# Patient Record
Sex: FEMALE | Race: BLACK OR AFRICAN AMERICAN | NOT HISPANIC OR LATINO | Employment: OTHER | URBAN - METROPOLITAN AREA
[De-identification: names, ages, dates, MRNs, and addresses within clinical notes are randomized per-mention and may not be internally consistent; named-entity substitution may affect disease eponyms.]

---

## 2017-03-21 ENCOUNTER — HOSPITAL ENCOUNTER (OUTPATIENT)
Dept: RADIOLOGY | Facility: HOSPITAL | Age: 82
Discharge: HOME | End: 2017-03-21
Attending: PHYSICAL MEDICINE & REHABILITATION
Payer: MEDICARE

## 2017-03-21 DIAGNOSIS — R92.8 ABNORMAL MAMMOGRAM OF LEFT BREAST: ICD-10-CM

## 2017-03-21 PROCEDURE — 76642 ULTRASOUND BREAST LIMITED: CPT | Mod: TC,LT

## 2017-03-21 PROCEDURE — 76642 ULTRASOUND BREAST LIMITED: CPT | Mod: 26,LT,, | Performed by: RADIOLOGY

## 2017-03-21 PROCEDURE — 77062 BREAST TOMOSYNTHESIS BI: CPT | Mod: 26,,, | Performed by: RADIOLOGY

## 2017-03-21 PROCEDURE — 77066 DX MAMMO INCL CAD BI: CPT | Mod: 26,,, | Performed by: RADIOLOGY

## 2017-03-21 PROCEDURE — 77066 DX MAMMO INCL CAD BI: CPT | Mod: TC

## 2017-07-28 PROBLEM — E78.00 HYPERCHOLESTEREMIA: Chronic | Status: ACTIVE | Noted: 2017-07-28

## 2017-08-23 ENCOUNTER — TELEPHONE (OUTPATIENT)
Dept: PRIMARY CARE CLINIC | Facility: CLINIC | Age: 82
End: 2017-08-23

## 2017-08-23 NOTE — TELEPHONE ENCOUNTER
----- Message from Enzo Jones sent at 8/23/2017  4:11 PM CDT -----  Contact: Brenda caregiver  Brenda caregiver called requesting a sooner appointment. Offered appointment declined.please call back at [phone]. Thanks,

## 2017-09-12 ENCOUNTER — OFFICE VISIT (OUTPATIENT)
Dept: PRIMARY CARE CLINIC | Facility: CLINIC | Age: 82
End: 2017-09-12
Payer: MEDICARE

## 2017-09-12 VITALS
TEMPERATURE: 98 F | WEIGHT: 131 LBS | BODY MASS INDEX: 25.72 KG/M2 | RESPIRATION RATE: 18 BRPM | DIASTOLIC BLOOD PRESSURE: 72 MMHG | HEART RATE: 83 BPM | OXYGEN SATURATION: 96 % | SYSTOLIC BLOOD PRESSURE: 126 MMHG | HEIGHT: 60 IN

## 2017-09-12 DIAGNOSIS — G30.0 EARLY ONSET ALZHEIMER'S DEMENTIA WITHOUT BEHAVIORAL DISTURBANCE: ICD-10-CM

## 2017-09-12 DIAGNOSIS — I10 ESSENTIAL HYPERTENSION: ICD-10-CM
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
## 2017-09-12 DIAGNOSIS — N28.9 RENAL INSUFFICIENCY: ICD-10-CM
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
## 2017-09-12 DIAGNOSIS — Z02.2 ENCOUNTER FOR EXAMINATION FOR ADMISSION TO NURSING HOME: ICD-10-CM

## 2017-09-12 DIAGNOSIS — M25.512 ACUTE PAIN OF LEFT SHOULDER: ICD-10-CM

## 2017-09-12 DIAGNOSIS — F02.80 EARLY ONSET ALZHEIMER'S DEMENTIA WITHOUT BEHAVIORAL DISTURBANCE: ICD-10-CM

## 2017-09-12 DIAGNOSIS — E78.00 HYPERCHOLESTEREMIA: Chronic | ICD-10-CM

## 2017-09-12 PROCEDURE — 3078F DIAST BP <80 MM HG: CPT | Mod: S$GLB,,, | Performed by: INTERNAL MEDICINE

## 2017-09-12 PROCEDURE — 1126F AMNT PAIN NOTED NONE PRSNT: CPT | Mod: S$GLB,,, | Performed by: INTERNAL MEDICINE

## 2017-09-12 PROCEDURE — 99213 OFFICE O/P EST LOW 20 MIN: CPT | Mod: S$GLB,,, | Performed by: INTERNAL MEDICINE

## 2017-09-12 PROCEDURE — 90662 IIV NO PRSV INCREASED AG IM: CPT | Mod: S$GLB,,, | Performed by: INTERNAL MEDICINE

## 2017-09-12 PROCEDURE — 1159F MED LIST DOCD IN RCRD: CPT | Mod: S$GLB,,, | Performed by: INTERNAL MEDICINE

## 2017-09-12 PROCEDURE — 86580 TB INTRADERMAL TEST: CPT | Mod: S$GLB,,, | Performed by: INTERNAL MEDICINE

## 2017-09-12 PROCEDURE — 3074F SYST BP LT 130 MM HG: CPT | Mod: S$GLB,,, | Performed by: INTERNAL MEDICINE

## 2017-09-12 PROCEDURE — G0008 ADMIN INFLUENZA VIRUS VAC: HCPCS | Mod: S$GLB,,, | Performed by: INTERNAL MEDICINE

## 2017-09-12 RX ORDER — FERROUS SULFATE 325(65) MG
TABLET ORAL
Refills: 5 | COMMUNITY
Start: 2017-09-05 | End: 2017-12-31 | Stop reason: SDUPTHER

## 2017-09-12 RX ORDER — VITAMIN B COMPLEX
CAPSULE ORAL
Refills: 5 | COMMUNITY
Start: 2017-09-05 | End: 2018-05-07 | Stop reason: SDUPTHER

## 2017-09-12 RX ORDER — IBUPROFEN 600 MG/1
600 TABLET ORAL EVERY 6 HOURS PRN
Qty: 30 TABLET | Refills: 1 | Status: SHIPPED | OUTPATIENT
Start: 2017-09-12 | End: 2017-09-22

## 2017-09-13 NOTE — PROGRESS NOTES
Subjective:       Patient ID: Priyanka Savage is a 82 y.o. female.    Chief Complaint: Fall (this morning, hit head)    HPI  Pt c/o felt in shower hit head no LOC c/o left shoulder pain no hA carol problem is dementia getting worse daughter work taking care of 2 kids harder to care for pt afrid may fall down more will look into NH placement at least temporary  Review of Systems    Objective:      Physical Exam   Constitutional: She is oriented to person, place, and time. She appears well-developed and well-nourished. No distress.   HENT:   Head: Normocephalic and atraumatic.   Right Ear: External ear normal.   Left Ear: External ear normal.   Nose: Nose normal.   Mouth/Throat: Oropharynx is clear and moist. No oropharyngeal exudate.   Eyes: Conjunctivae and EOM are normal. Pupils are equal, round, and reactive to light. Right eye exhibits no discharge. Left eye exhibits no discharge.   Neck: Normal range of motion. Neck supple. No thyromegaly present.   Cardiovascular: Normal rate, regular rhythm, normal heart sounds and intact distal pulses.  Exam reveals no gallop and no friction rub.    No murmur heard.  Pulmonary/Chest: Effort normal and breath sounds normal. No respiratory distress. She has no wheezes. She has no rales. She exhibits no tenderness.   Abdominal: Soft. Bowel sounds are normal. She exhibits no distension. There is no tenderness. There is no rebound and no guarding.   Musculoskeletal: Normal range of motion. She exhibits no edema, tenderness or deformity.   Lymphadenopathy:     She has no cervical adenopathy.   Neurological: She is alert and oriented to person, place, and time.   Pt not saying much quite no distress not able to hold conversation though process is limited   Skin: Skin is warm and dry. Capillary refill takes less than 2 seconds. No rash noted. No erythema.   Psychiatric: She has a normal mood and affect. Judgment and thought content normal.   Nursing note and vitals reviewed.       Assessment:       1. Early onset Alzheimer's dementia without behavioral disturbance    2. Acute pain of left shoulder    3. Essential hypertension    4. Hypercholesteremia    5. Renal insufficiency    6. Encounter for examination for admission to nursing home        Plan:       Early onset Alzheimer's dementia without behavioral disturbance  -     POCT TB Skin Test Read    Acute pain of left shoulder  -     ibuprofen (ADVIL,MOTRIN) 600 MG tablet; Take 1 tablet (600 mg total) by mouth every 6 (six) hours as needed for Pain.  Dispense: 30 tablet; Refill: 1  -     POCT TB Skin Test Read    Essential hypertension  -     POCT TB Skin Test Read    Hypercholesteremia  -     POCT TB Skin Test Read    Renal insufficiency  -     POCT TB Skin Test Read    Encounter for examination for admission to nursing home    Other orders  -     Discontinue: influenza vaccine 180 mcg/0.5 mL(for patients > 65) injection; Inject 0.5 mLs into the muscle vaccine x 1 dose for Meets Vaccination Criteria.  -     Influenza - High Dose (65+) (PF) (IM)

## 2018-01-01 RX ORDER — FERROUS SULFATE 325(65) MG
TABLET ORAL
Qty: 30 TABLET | Refills: 0 | Status: SHIPPED | OUTPATIENT
Start: 2018-01-01 | End: 2018-01-31 | Stop reason: SDUPTHER

## 2018-01-31 RX ORDER — FERROUS SULFATE 325(65) MG
TABLET ORAL
Qty: 30 TABLET | Refills: 0 | Status: SHIPPED | OUTPATIENT
Start: 2018-01-31 | End: 2018-03-06 | Stop reason: SDUPTHER

## 2018-03-06 RX ORDER — FERROUS SULFATE 325(65) MG
TABLET ORAL
Qty: 30 TABLET | Refills: 0 | Status: SHIPPED | OUTPATIENT
Start: 2018-03-06 | End: 2018-04-06 | Stop reason: SDUPTHER

## 2018-04-08 RX ORDER — FERROUS SULFATE 325(65) MG
TABLET ORAL
Qty: 30 TABLET | Refills: 0 | Status: SHIPPED | OUTPATIENT
Start: 2018-04-08 | End: 2018-05-22 | Stop reason: SDUPTHER

## 2018-05-07 RX ORDER — VITAMIN B COMPLEX
CAPSULE ORAL
Qty: 60 CAPSULE | Refills: 0 | Status: ON HOLD | OUTPATIENT
Start: 2018-05-07 | End: 2018-06-18 | Stop reason: HOSPADM

## 2018-06-13 PROBLEM — R41.82 ALTERED MENTAL STATUS: Status: ACTIVE | Noted: 2018-06-13

## 2018-06-13 PROBLEM — E87.20 LACTIC ACIDOSIS: Status: ACTIVE | Noted: 2018-06-13

## 2018-06-13 PROBLEM — Z71.3 ENCOUNTER FOR DIETARY CONSULTATION: Chronic | Status: ACTIVE | Noted: 2018-06-13

## 2018-06-13 PROBLEM — E86.0 DEHYDRATION: Status: ACTIVE | Noted: 2018-06-13

## 2018-06-13 PROBLEM — N28.9 ACUTE RENAL IMPAIRMENT: Status: ACTIVE | Noted: 2018-06-13

## 2018-06-14 PROBLEM — N28.9 ACUTE RENAL IMPAIRMENT: Status: RESOLVED | Noted: 2018-06-13 | Resolved: 2018-06-14

## 2018-06-14 PROBLEM — E87.20 LACTIC ACIDOSIS: Status: RESOLVED | Noted: 2018-06-13 | Resolved: 2018-06-14

## 2018-06-16 PROBLEM — R93.0 ABNORMAL MRI OF THE HEAD: Status: ACTIVE | Noted: 2018-06-16

## 2018-06-18 PROBLEM — E86.0 DEHYDRATION: Status: RESOLVED | Noted: 2018-06-13 | Resolved: 2018-06-18

## 2018-06-18 PROBLEM — R41.82 ALTERED MENTAL STATUS: Status: RESOLVED | Noted: 2018-06-13 | Resolved: 2018-06-18

## 2018-06-18 PROBLEM — I63.9 ACUTE CVA (CEREBROVASCULAR ACCIDENT): Status: ACTIVE | Noted: 2018-06-18

## 2018-07-17 PROBLEM — R40.0 SOMNOLENCE: Status: ACTIVE | Noted: 2018-07-17

## 2018-07-17 PROBLEM — E87.0 HYPERNATREMIA: Status: ACTIVE | Noted: 2018-07-17

## 2018-07-17 PROBLEM — I63.9 CEREBROVASCULAR ACCIDENT (CVA): Status: ACTIVE | Noted: 2018-07-17

## 2018-07-17 PROBLEM — R40.4 TRANSIENT ALTERATION OF AWARENESS: Status: ACTIVE | Noted: 2018-06-13

## 2018-07-17 PROBLEM — N18.30 CKD (CHRONIC KIDNEY DISEASE), STAGE III: Status: ACTIVE | Noted: 2018-07-17

## 2018-07-20 PROBLEM — R13.10 DYSPHAGIA: Status: ACTIVE | Noted: 2018-07-20

## 2018-07-20 PROBLEM — E46 MALNUTRITION: Status: ACTIVE | Noted: 2018-07-20

## 2018-08-09 PROBLEM — I95.9 HYPOTENSION: Status: ACTIVE | Noted: 2018-08-09

## 2018-08-10 PROBLEM — L89.303 DECUBITUS ULCER OF BUTTOCK, STAGE 3: Status: ACTIVE | Noted: 2018-08-10

## 2018-08-12 PROBLEM — E44.0 MALNUTRITION OF MODERATE DEGREE: Status: ACTIVE | Noted: 2018-08-12

## 2018-08-13 PROBLEM — L08.9 WOUND INFECTION: Status: ACTIVE | Noted: 2018-08-13

## 2018-08-13 PROBLEM — T14.8XXA WOUND INFECTION: Status: ACTIVE | Noted: 2018-08-13

## 2018-09-29 ENCOUNTER — NURSE TRIAGE (OUTPATIENT)
Dept: ADMINISTRATIVE | Facility: CLINIC | Age: 83
End: 2018-09-29

## 2018-09-29 PROBLEM — E87.0 DEHYDRATION WITH HYPERNATREMIA: Status: ACTIVE | Noted: 2018-09-29

## 2018-09-29 PROBLEM — E86.0 DEHYDRATION WITH HYPERNATREMIA: Status: ACTIVE | Noted: 2018-09-29

## 2018-09-29 NOTE — TELEPHONE ENCOUNTER
Reason for Disposition   Sounds like a life-threatening emergency to the triager    Protocols used: ST HEART RATE AND HEARTBEAT [id number]    David from Home Health calling regarding Pt's signs and symptoms.  Low blood pressure reading of 96/46    Resp 40    Temp 98.4 and Glucose 150.  Pt is also tube feed.  Care advice given.

## 2018-09-30 PROBLEM — N17.9 AKI (ACUTE KIDNEY INJURY): Status: ACTIVE | Noted: 2018-09-30

## 2018-09-30 PROBLEM — R41.82 ALTERED MENTAL STATUS: Status: ACTIVE | Noted: 2018-09-30

## 2018-09-30 PROBLEM — R31.9 URINARY TRACT INFECTION WITH HEMATURIA: Status: ACTIVE | Noted: 2018-09-30

## 2018-09-30 PROBLEM — N39.0 URINARY TRACT INFECTION WITH HEMATURIA: Status: ACTIVE | Noted: 2018-09-30

## 2018-10-01 PROBLEM — D64.9 SYMPTOMATIC ANEMIA: Status: ACTIVE | Noted: 2018-10-01

## 2018-10-03 PROBLEM — N17.9 AKI (ACUTE KIDNEY INJURY): Status: RESOLVED | Noted: 2018-09-30 | Resolved: 2018-10-03

## 2018-10-09 PROBLEM — R41.82 ALTERED MENTAL STATUS: Status: RESOLVED | Noted: 2018-09-30 | Resolved: 2018-10-09

## 2018-10-09 PROBLEM — L89.150 PRESSURE INJURY OF SACRAL REGION, UNSTAGEABLE: Chronic | Status: ACTIVE | Noted: 2018-10-09

## 2018-10-09 PROBLEM — F03.90 DEMENTIA WITHOUT BEHAVIORAL DISTURBANCE: Chronic | Status: ACTIVE | Noted: 2018-10-09

## 2018-10-09 PROBLEM — E86.0 DEHYDRATION WITH HYPERNATREMIA: Status: RESOLVED | Noted: 2018-09-29 | Resolved: 2018-10-09

## 2018-10-09 PROBLEM — E87.0 DEHYDRATION WITH HYPERNATREMIA: Status: RESOLVED | Noted: 2018-09-29 | Resolved: 2018-10-09

## 2018-10-09 PROBLEM — N39.0 URINARY TRACT INFECTION DUE TO PROTEUS: Status: ACTIVE | Noted: 2018-10-09

## 2018-10-09 PROBLEM — A49.02 INFECTION OF WOUND DUE TO METHICILLIN RESISTANT STAPHYLOCOCCUS AUREUS (MRSA): Status: ACTIVE | Noted: 2018-10-09

## 2018-10-09 PROBLEM — B96.4 URINARY TRACT INFECTION DUE TO PROTEUS: Status: ACTIVE | Noted: 2018-10-09

## 2018-11-24 PROBLEM — N30.01 ACUTE CYSTITIS WITH HEMATURIA: Status: ACTIVE | Noted: 2018-11-24

## 2018-11-24 PROBLEM — K92.1 GASTROINTESTINAL HEMORRHAGE WITH MELENA: Status: ACTIVE | Noted: 2018-11-24

## 2018-11-24 PROBLEM — A41.9 SEPSIS: Status: ACTIVE | Noted: 2018-11-24

## 2018-11-27 PROBLEM — R00.0 TACHYCARDIA: Status: ACTIVE | Noted: 2018-11-27

## 2018-11-28 PROBLEM — E87.5 HYPERKALEMIA: Status: ACTIVE | Noted: 2018-11-28

## 2018-11-30 PROBLEM — N39.0 COMPLICATED UTI (URINARY TRACT INFECTION): Status: ACTIVE | Noted: 2018-11-30

## 2018-11-30 PROBLEM — E87.5 HYPERKALEMIA: Status: RESOLVED | Noted: 2018-11-28 | Resolved: 2018-11-30

## 2018-11-30 PROBLEM — A41.9 SEPSIS: Status: RESOLVED | Noted: 2018-11-24 | Resolved: 2018-11-30

## 2018-11-30 PROBLEM — R00.0 TACHYCARDIA: Status: RESOLVED | Noted: 2018-11-27 | Resolved: 2018-11-30

## 2019-01-19 PROBLEM — J18.9 HCAP (HEALTHCARE-ASSOCIATED PNEUMONIA): Status: ACTIVE | Noted: 2019-01-19

## 2019-01-20 PROBLEM — D72.829 LEUKOCYTOSIS: Status: ACTIVE | Noted: 2019-01-20

## 2019-01-20 PROBLEM — Z16.21 VRE (VANCOMYCIN-RESISTANT ENTEROCOCCI) INFECTION: Status: ACTIVE | Noted: 2019-01-20

## 2019-01-20 PROBLEM — R56.9 SEIZURE: Status: ACTIVE | Noted: 2019-01-20

## 2019-01-20 PROBLEM — A49.1 VRE (VANCOMYCIN-RESISTANT ENTEROCOCCI) INFECTION: Status: ACTIVE | Noted: 2019-01-20

## 2019-01-20 PROBLEM — L89.152 PRESSURE INJURY OF SACRAL REGION, STAGE 2: Chronic | Status: ACTIVE | Noted: 2019-01-20

## 2019-01-20 PROBLEM — I50.22 CHRONIC SYSTOLIC HEART FAILURE: Chronic | Status: ACTIVE | Noted: 2019-01-20

## 2019-01-23 PROBLEM — Z16.21 VRE (VANCOMYCIN-RESISTANT ENTEROCOCCI) INFECTION: Status: RESOLVED | Noted: 2019-01-20 | Resolved: 2019-01-23

## 2019-01-23 PROBLEM — A49.1 VRE (VANCOMYCIN-RESISTANT ENTEROCOCCI) INFECTION: Status: RESOLVED | Noted: 2019-01-20 | Resolved: 2019-01-23

## 2019-01-24 PROBLEM — F01.518 VASCULAR DEMENTIA WITH BEHAVIOR DISTURBANCE: Status: ACTIVE | Noted: 2019-01-24

## 2019-01-26 PROBLEM — B99.9 INFECTION: Status: ACTIVE | Noted: 2019-01-26

## 2019-01-28 PROBLEM — D72.829 LEUKOCYTOSIS: Status: RESOLVED | Noted: 2019-01-20 | Resolved: 2019-01-28

## 2019-01-29 PROBLEM — E86.0 DEHYDRATION: Status: RESOLVED | Noted: 2018-06-13 | Resolved: 2019-01-29
